# Patient Record
Sex: FEMALE | Race: WHITE
[De-identification: names, ages, dates, MRNs, and addresses within clinical notes are randomized per-mention and may not be internally consistent; named-entity substitution may affect disease eponyms.]

---

## 2022-05-10 LAB
ANION GAP SERPL CALCULATED.4IONS-SCNC: 4 MMOL/L (ref 6–16)
BUN SERPL-MCNC: 14 MG/DL (ref 8–24)
CALCIUM SERPL-MCNC: 8.8 MG/DL (ref 8.5–10.1)
CHLORIDE SERPL-SCNC: 108 MMOL/L (ref 98–108)
CO2 SERPL-SCNC: 26 MMOL/L (ref 21–32)
CREAT SERPL-MCNC: 0.79 MG/DL (ref 0.4–1)
GLUCOSE SERPL-MCNC: 124 MG/DL (ref 70–99)
POTASSIUM SERPL-SCNC: 3.9 MMOL/L (ref 3.5–5.5)
SODIUM SERPL-SCNC: 138 MMOL/L (ref 136–145)

## 2022-05-12 ENCOUNTER — HOSPITAL ENCOUNTER (OUTPATIENT)
Dept: HOSPITAL 95 - ORSCSDS | Age: 57
Discharge: HOME | End: 2022-05-12
Attending: OBSTETRICS & GYNECOLOGY
Payer: OTHER GOVERNMENT

## 2022-05-12 VITALS — HEIGHT: 65 IN | BODY MASS INDEX: 29.75 KG/M2 | WEIGHT: 178.57 LBS

## 2022-05-12 DIAGNOSIS — F17.210: ICD-10-CM

## 2022-05-12 DIAGNOSIS — N95.0: ICD-10-CM

## 2022-05-12 DIAGNOSIS — K21.9: ICD-10-CM

## 2022-05-12 DIAGNOSIS — D06.0: Primary | ICD-10-CM

## 2022-05-12 DIAGNOSIS — Z79.899: ICD-10-CM

## 2022-05-12 PROCEDURE — 0UBC7ZX EXCISION OF CERVIX, VIA NATURAL OR ARTIFICIAL OPENING, DIAGNOSTIC: ICD-10-PCS | Performed by: OBSTETRICS & GYNECOLOGY

## 2022-05-12 NOTE — NUR
05/12/22 1226 Karla Snyder
DISCHARGE INSTRUCTIONS REVIEWED WITH PT AND [facility](SON). ALL
QUESTIONS ASWERED. PT TOLERATING PO FLUIDS AND CRACKERS WELL. DENIES
NAUSEA OR PAIN. IS READY FOR DISCHARGE HOME. VSS.